# Patient Record
Sex: MALE | Race: WHITE | NOT HISPANIC OR LATINO | Employment: STUDENT | URBAN - METROPOLITAN AREA
[De-identification: names, ages, dates, MRNs, and addresses within clinical notes are randomized per-mention and may not be internally consistent; named-entity substitution may affect disease eponyms.]

---
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
## 2024-04-06 ENCOUNTER — HOSPITAL ENCOUNTER (EMERGENCY)
Facility: HOSPITAL | Age: 24
Discharge: HOME | End: 2024-04-06
Attending: EMERGENCY MEDICINE | Admitting: EMERGENCY MEDICINE
Payer: COMMERCIAL

## 2024-04-06 ENCOUNTER — APPOINTMENT (EMERGENCY)
Dept: CT IMAGING | Facility: HOSPITAL | Age: 24
End: 2024-04-06
Payer: COMMERCIAL

## 2024-04-06 VITALS
SYSTOLIC BLOOD PRESSURE: 117 MMHG | DIASTOLIC BLOOD PRESSURE: 77 MMHG | TEMPERATURE: 98.4 F | OXYGEN SATURATION: 98 % | HEART RATE: 98 BPM | RESPIRATION RATE: 18 BRPM | WEIGHT: 219.8 LBS

## 2024-04-06 DIAGNOSIS — J36 PERITONSILLAR ABSCESS: Primary | ICD-10-CM

## 2024-04-06 LAB
ALBUMIN SERPL BCP-MCNC: 4.3 G/DL (ref 3.5–5)
ALP SERPL-CCNC: 55 U/L (ref 34–104)
ALT SERPL W P-5'-P-CCNC: 9 U/L (ref 7–52)
ANION GAP SERPL CALCULATED.3IONS-SCNC: 16 MMOL/L (ref 4–13)
APTT PPP: 32 SECONDS (ref 23–37)
AST SERPL W P-5'-P-CCNC: 9 U/L (ref 13–39)
ATRIAL RATE: 133 BPM
BASOPHILS # BLD AUTO: 0.11 THOUSANDS/ÂΜL (ref 0–0.1)
BASOPHILS NFR BLD AUTO: 0 % (ref 0–1)
BILIRUB SERPL-MCNC: 0.68 MG/DL (ref 0.2–1)
BUN SERPL-MCNC: 16 MG/DL (ref 5–25)
CALCIUM SERPL-MCNC: 10 MG/DL (ref 8.4–10.2)
CHLORIDE SERPL-SCNC: 97 MMOL/L (ref 96–108)
CO2 SERPL-SCNC: 21 MMOL/L (ref 21–32)
CREAT SERPL-MCNC: 1.07 MG/DL (ref 0.6–1.3)
EOSINOPHIL # BLD AUTO: 0.03 THOUSAND/ÂΜL (ref 0–0.61)
EOSINOPHIL NFR BLD AUTO: 0 % (ref 0–6)
ERYTHROCYTE [DISTWIDTH] IN BLOOD BY AUTOMATED COUNT: 12.9 % (ref 11.6–15.1)
GFR SERPL CREATININE-BSD FRML MDRD: 98 ML/MIN/1.73SQ M
GLUCOSE SERPL-MCNC: 106 MG/DL (ref 65–140)
HCT VFR BLD AUTO: 52.7 % (ref 36.5–49.3)
HGB BLD-MCNC: 18.1 G/DL (ref 12–17)
IMM GRANULOCYTES # BLD AUTO: 0.21 THOUSAND/UL (ref 0–0.2)
IMM GRANULOCYTES NFR BLD AUTO: 1 % (ref 0–2)
INR PPP: 1.05 (ref 0.84–1.19)
LACTATE SERPL-SCNC: 0.9 MMOL/L (ref 0.5–2)
LYMPHOCYTES # BLD AUTO: 1.92 THOUSANDS/ÂΜL (ref 0.6–4.47)
LYMPHOCYTES NFR BLD AUTO: 7 % (ref 14–44)
MCH RBC QN AUTO: 29.6 PG (ref 26.8–34.3)
MCHC RBC AUTO-ENTMCNC: 34.3 G/DL (ref 31.4–37.4)
MCV RBC AUTO: 86 FL (ref 82–98)
MONOCYTES # BLD AUTO: 3.26 THOUSAND/ÂΜL (ref 0.17–1.22)
MONOCYTES NFR BLD AUTO: 11 % (ref 4–12)
NEUTROPHILS # BLD AUTO: 23.01 THOUSANDS/ÂΜL (ref 1.85–7.62)
NEUTS SEG NFR BLD AUTO: 81 % (ref 43–75)
NRBC BLD AUTO-RTO: 0 /100 WBCS
P AXIS: 57 DEGREES
PLATELET # BLD AUTO: 554 THOUSANDS/UL (ref 149–390)
PMV BLD AUTO: 9.4 FL (ref 8.9–12.7)
POTASSIUM SERPL-SCNC: 3.7 MMOL/L (ref 3.5–5.3)
PR INTERVAL: 132 MS
PROCALCITONIN SERPL-MCNC: 0.14 NG/ML
PROT SERPL-MCNC: 8.8 G/DL (ref 6.4–8.4)
PROTHROMBIN TIME: 14.3 SECONDS (ref 11.6–14.5)
QRS AXIS: 59 DEGREES
QRSD INTERVAL: 100 MS
QT INTERVAL: 292 MS
QTC INTERVAL: 434 MS
RBC # BLD AUTO: 6.11 MILLION/UL (ref 3.88–5.62)
S PYO DNA THROAT QL NAA+PROBE: NOT DETECTED
SODIUM SERPL-SCNC: 134 MMOL/L (ref 135–147)
T WAVE AXIS: 59 DEGREES
VENTRICULAR RATE: 133 BPM
WBC # BLD AUTO: 28.54 THOUSAND/UL (ref 4.31–10.16)

## 2024-04-06 PROCEDURE — 83605 ASSAY OF LACTIC ACID: CPT

## 2024-04-06 PROCEDURE — 87070 CULTURE OTHR SPECIMN AEROBIC: CPT

## 2024-04-06 PROCEDURE — 36415 COLL VENOUS BLD VENIPUNCTURE: CPT

## 2024-04-06 PROCEDURE — 96366 THER/PROPH/DIAG IV INF ADDON: CPT

## 2024-04-06 PROCEDURE — 99284 EMERGENCY DEPT VISIT MOD MDM: CPT

## 2024-04-06 PROCEDURE — 80053 COMPREHEN METABOLIC PANEL: CPT

## 2024-04-06 PROCEDURE — 96375 TX/PRO/DX INJ NEW DRUG ADDON: CPT

## 2024-04-06 PROCEDURE — 87651 STREP A DNA AMP PROBE: CPT

## 2024-04-06 PROCEDURE — 85610 PROTHROMBIN TIME: CPT

## 2024-04-06 PROCEDURE — 87040 BLOOD CULTURE FOR BACTERIA: CPT

## 2024-04-06 PROCEDURE — 87147 CULTURE TYPE IMMUNOLOGIC: CPT

## 2024-04-06 PROCEDURE — 93005 ELECTROCARDIOGRAM TRACING: CPT

## 2024-04-06 PROCEDURE — 85025 COMPLETE CBC W/AUTO DIFF WBC: CPT

## 2024-04-06 PROCEDURE — 70491 CT SOFT TISSUE NECK W/DYE: CPT

## 2024-04-06 PROCEDURE — 93010 ELECTROCARDIOGRAM REPORT: CPT | Performed by: INTERNAL MEDICINE

## 2024-04-06 PROCEDURE — 84145 PROCALCITONIN (PCT): CPT

## 2024-04-06 PROCEDURE — 87077 CULTURE AEROBIC IDENTIFY: CPT

## 2024-04-06 PROCEDURE — 85730 THROMBOPLASTIN TIME PARTIAL: CPT

## 2024-04-06 PROCEDURE — 87205 SMEAR GRAM STAIN: CPT

## 2024-04-06 PROCEDURE — 96365 THER/PROPH/DIAG IV INF INIT: CPT

## 2024-04-06 RX ORDER — LIDOCAINE HYDROCHLORIDE AND EPINEPHRINE 10; 10 MG/ML; UG/ML
20 INJECTION, SOLUTION INFILTRATION; PERINEURAL ONCE
Status: COMPLETED | OUTPATIENT
Start: 2024-04-06 | End: 2024-04-06

## 2024-04-06 RX ORDER — DEXAMETHASONE SODIUM PHOSPHATE 10 MG/ML
10 INJECTION, SOLUTION INTRAMUSCULAR; INTRAVENOUS ONCE
Status: COMPLETED | OUTPATIENT
Start: 2024-04-06 | End: 2024-04-06

## 2024-04-06 RX ORDER — METHYLPREDNISOLONE 4 MG/1
TABLET ORAL
Qty: 21 TABLET | Refills: 0 | Status: SHIPPED | OUTPATIENT
Start: 2024-04-06 | End: 2024-04-23

## 2024-04-06 RX ORDER — AMOXICILLIN AND CLAVULANATE POTASSIUM 875; 125 MG/1; MG/1
1 TABLET, FILM COATED ORAL EVERY 12 HOURS
Qty: 20 TABLET | Refills: 0 | Status: SHIPPED | OUTPATIENT
Start: 2024-04-06 | End: 2024-04-16

## 2024-04-06 RX ADMIN — AMPICILLIN AND SULBACTAM 3 G: 10; 5 INJECTION, POWDER, FOR SOLUTION INTRAVENOUS at 14:26

## 2024-04-06 RX ADMIN — SODIUM CHLORIDE 1000 ML: 0.9 INJECTION, SOLUTION INTRAVENOUS at 14:03

## 2024-04-06 RX ADMIN — IOHEXOL 70 ML: 350 INJECTION, SOLUTION INTRAVENOUS at 15:43

## 2024-04-06 RX ADMIN — AMPICILLIN AND SULBACTAM 3 G: 10; 5 INJECTION, POWDER, FOR SOLUTION INTRAVENOUS at 20:07

## 2024-04-06 RX ADMIN — LIDOCAINE HYDROCHLORIDE,EPINEPHRINE BITARTRATE 20 ML: 10; .01 INJECTION, SOLUTION INFILTRATION; PERINEURAL at 18:03

## 2024-04-06 RX ADMIN — DEXAMETHASONE SODIUM PHOSPHATE 10 MG: 10 INJECTION INTRAMUSCULAR; INTRAVENOUS at 14:03

## 2024-04-06 NOTE — ED PROVIDER NOTES
History  Chief Complaint   Patient presents with    Sore Throat - Complicated     Pt reports sore throat/tonsils swelling x4-5 days, pt reports SOB/difficulty breathing, muffled voice     Miguelito is a 23 year old male with no pertinent PMHx presenting to the ED for sore throat, throat swelling, shortness of breath x 1 week. The shortness of breath is worsened when walking, but at rest he is able to comfortably breath. Is having difficulty swallowing due to the swollen tonsils and due to the pain, but is still able to without excessive drooling. He denies fevers, but notes he has been taking ibuprofen a lot - took 1,500 mg prior to arrival to the ED, and also notes he is sweating a lot and waking up in puddles. Denies sick contacts, has never had this before, has not been seen by a provider for this complaint as of yet.         Prior to Admission Medications   Prescriptions Last Dose Informant Patient Reported? Taking?   fluticasone (FLONASE) 50 mcg/act nasal spray   No No   Si spray into each nostril daily      Facility-Administered Medications: None       History reviewed. No pertinent past medical history.    History reviewed. No pertinent surgical history.    History reviewed. No pertinent family history.  I have reviewed and agree with the history as documented.    E-Cigarette/Vaping    E-Cigarette Use Never User      E-Cigarette/Vaping Substances    Nicotine Yes     THC Yes     CBD No     Flavoring No     Other No     Unknown No      Social History     Tobacco Use    Smoking status: Every Day    Smokeless tobacco: Never   Vaping Use    Vaping status: Never Used   Substance Use Topics    Alcohol use: Yes    Drug use: Yes     Types: Marijuana     Comment: last smoked yesterday       Review of Systems   Constitutional:  Positive for diaphoresis. Negative for chills and fever.   HENT:  Positive for sore throat, trouble swallowing and voice change. Negative for facial swelling.    Eyes:  Negative for  "photophobia and visual disturbance.   Respiratory:  Positive for shortness of breath. Negative for cough.    Cardiovascular:  Negative for chest pain and palpitations.   Musculoskeletal:  Negative for myalgias, neck pain and neck stiffness.   Skin:  Negative for rash.   Neurological:  Negative for light-headedness and headaches.       Physical Exam  Physical Exam  Vitals reviewed.   Constitutional:       General: He is not in acute distress.     Appearance: Normal appearance. He is ill-appearing and diaphoretic. He is not toxic-appearing.      Comments: Patient speaking with \"hot potato\" voice.   HENT:      Head: Normocephalic and atraumatic.      Right Ear: Tympanic membrane, ear canal and external ear normal.      Left Ear: Tympanic membrane, ear canal and external ear normal.      Nose: Nose normal.      Mouth/Throat:      Tonsils: Tonsillar exudate and tonsillar abscess (right sided) present.      Comments: Pharynx is patent. Uvular deviation.   Eyes:      General: No scleral icterus.        Right eye: No discharge.         Left eye: No discharge.      Extraocular Movements: Extraocular movements intact.      Conjunctiva/sclera: Conjunctivae normal.   Cardiovascular:      Rate and Rhythm: Normal rate and regular rhythm.      Pulses: Normal pulses.      Heart sounds: Normal heart sounds.   Pulmonary:      Effort: Pulmonary effort is normal. No tachypnea, accessory muscle usage, respiratory distress or retractions.      Breath sounds: Normal breath sounds.   Abdominal:      General: Bowel sounds are normal.      Palpations: Abdomen is soft.      Tenderness: There is no abdominal tenderness. There is no right CVA tenderness, left CVA tenderness, guarding or rebound.   Musculoskeletal:         General: Normal range of motion.      Cervical back: Normal range of motion and neck supple. No rigidity or tenderness.   Lymphadenopathy:      Cervical: Cervical adenopathy present.   Skin:     General: Skin is warm.      " Capillary Refill: Capillary refill takes less than 2 seconds.   Neurological:      General: No focal deficit present.      Mental Status: He is alert.      Sensory: Sensation is intact.      Motor: Motor function is intact. No weakness.      Gait: Gait is intact.   Psychiatric:         Mood and Affect: Mood normal.         Behavior: Behavior normal.       Vital Signs  ED Triage Vitals   Temperature Pulse Respirations Blood Pressure SpO2   04/06/24 1322 04/06/24 1322 04/06/24 1322 04/06/24 1322 04/06/24 1322   98.4 °F (36.9 °C) (!) 146 18 109/80 98 %      Temp Source Heart Rate Source Patient Position - Orthostatic VS BP Location FiO2 (%)   04/06/24 1322 04/06/24 1322 04/06/24 1322 04/06/24 1322 --   Oral Monitor Sitting Right arm       Pain Score       04/06/24 2000       No Pain           Vitals:    04/06/24 1700 04/06/24 1800 04/06/24 1900 04/06/24 2000   BP: 110/71 114/65 116/75 117/77   Pulse: (!) 108 (!) 107 104 98   Patient Position - Orthostatic VS: Sitting Sitting           Visual Acuity      ED Medications  Medications   sodium chloride 0.9 % bolus 1,000 mL (0 mL Intravenous Stopped 4/6/24 1503)   ampicillin-sulbactam (UNASYN) 3 g in sodium chloride 0.9 % 100 mL IVPB (0 g Intravenous Stopped 4/6/24 1456)   dexamethasone (PF) (DECADRON) injection 10 mg (10 mg Intravenous Given 4/6/24 1403)   iohexol (OMNIPAQUE) 350 MG/ML injection (MULTI-DOSE) 70 mL (70 mL Intravenous Given 4/6/24 1543)   lidocaine-epinephrine (XYLOCAINE/EPINEPHRINE) 1 %-1:100,000 injection 20 mL (20 mL Infiltration Given by Other 4/6/24 1803)   ampicillin-sulbactam (UNASYN) 3 g in sodium chloride 0.9 % 100 mL IVPB (3 g Intravenous New Bag 4/6/24 2007)       Diagnostic Studies  Results Reviewed       Procedure Component Value Units Date/Time    Wound culture and Gram stain [[id number]] Collected: 04/06/24 2009    Lab Status: In process Specimen: Wound from Head Updated: 04/06/24 2012    Blood culture #1 [[id number]] Collected: 04/06/24 1408     Lab Status: Preliminary result Specimen: Blood from Arm, Left Updated: 04/06/24 1901     Blood Culture Received in Microbiology Lab. Culture in Progress.    Blood culture #2 [[id number]] Collected: 04/06/24 1411    Lab Status: Preliminary result Specimen: Blood from Arm, Right Updated: 04/06/24 1901     Blood Culture Received in Microbiology Lab. Culture in Progress.    Strep A PCR [[id number]]  (Normal) Collected: 04/06/24 1413    Lab Status: Final result Specimen: Throat Updated: 04/06/24 1454     STREP A PCR Not Detected    Procalcitonin [[id number]]  (Normal) Collected: 04/06/24 1349    Lab Status: Final result Specimen: Blood from Arm, Left Updated: 04/06/24 1453     Procalcitonin 0.14 ng/ml     Comprehensive metabolic panel [[id number]]  (Abnormal) Collected: 04/06/24 1403    Lab Status: Final result Specimen: Blood from Arm, Left Updated: 04/06/24 1443     Sodium 134 mmol/L      Potassium 3.7 mmol/L      Chloride 97 mmol/L      CO2 21 mmol/L      ANION GAP 16 mmol/L      BUN 16 mg/dL      Creatinine 1.07 mg/dL      Glucose 106 mg/dL      Calcium 10.0 mg/dL      AST 9 U/L      ALT 9 U/L      Alkaline Phosphatase 55 U/L      Total Protein 8.8 g/dL      Albumin 4.3 g/dL      Total Bilirubin 0.68 mg/dL      eGFR 98 ml/min/1.73sq m     Narrative:      National Kid[employer] Foundation guidelines for Chronic Kidney Disease (CKD):     Stage 1 with normal or high GFR (GFR > 90 mL/min/1.73 square meters)    Stage 2 Mild CKD (GFR = 60-89 mL/min/1.73 square meters)    Stage 3A Moderate CKD (GFR = 45-59 mL/min/1.73 square meters)    Stage 3B Moderate CKD (GFR = 30-44 mL/min/1.73 square meters)    Stage 4 Severe CKD (GFR = 15-29 mL/min/1.73 square meters)    Stage 5 End Stage CKD (GFR <15 mL/min/1.73 square meters)  Note: GFR calculation is accurate only with a steady state creatinine    Lactic acid, plasma (w/reflex if result > 2.0) [[id number]]  (Normal) Collected: 04/06/24 1403    Lab Status: Final result Specimen: Blood  from Arm, Left Updated: 04/06/24 1442     LACTIC ACID 0.9 mmol/L     Narrative:      Result may be elevated if tourniquet was used during collection.    Protime-INR [[id number]]  (Normal) Collected: 04/06/24 1349    Lab Status: Final result Specimen: Blood from Arm, Left Updated: 04/06/24 1438     Protime 14.3 seconds      INR 1.05    APTT [[id number]]  (Normal) Collected: 04/06/24 1349    Lab Status: Final result Specimen: Blood from Arm, Left Updated: 04/06/24 1438     PTT 32 seconds     CBC and differential [[id number]]  (Abnormal) Collected: 04/06/24 1349    Lab Status: Final result Specimen: Blood from Arm, Left Updated: 04/06/24 1425     WBC 28.54 Thousand/uL      RBC 6.11 Million/uL      Hemoglobin 18.1 g/dL      Hematocrit 52.7 %      MCV 86 fL      MCH 29.6 pg      MCHC 34.3 g/dL      RDW 12.9 %      MPV 9.4 fL      Platelets 554 Thousands/uL      nRBC 0 /100 WBCs      Neutrophils Relative 81 %      Immature Grans % 1 %      Lymphocytes Relative 7 %      Monocytes Relative 11 %      Eosinophils Relative 0 %      Basophils Relative 0 %      Neutrophils Absolute 23.01 Thousands/µL      Absolute Immature Grans 0.21 Thousand/uL      Absolute Lymphocytes 1.92 Thousands/µL      Absolute Monocytes 3.26 Thousand/µL      Eosinophils Absolute 0.03 Thousand/µL      Basophils Absolute 0.11 Thousands/µL                    CT soft tissue neck   Final Result by Ranjith Augustin MD (04/06 1723)      1.  Acute palatine tonsillitis with right peritonsillar inflammation and 3 cm right peritonsillar abscess.   2.  Reactive prominent cervical lymph nodes (right greater than left).            The study was marked in EPIC for immediate notification.               Workstation performed: [id number]                    Procedures  ECG 12 Lead Documentation Only    Date/Time: 4/6/2024 1:40 PM    Performed by: Fatimah Rodriguez PA-C  Authorized by: Fatimah Rodriguez PA-C    Indications / Diagnosis:  Tachycardia.  ECG reviewed by  me, the ED Provider: yes    Patient location:  ED  Previous ECG:     Previous ECG:  Unavailable    Comparison to cardiac monitor: Yes    Rate:     ECG rate:  133    ECG rate assessment: tachycardic    Rhythm:     Rhythm: sinus tachycardia    Ectopy:     Ectopy: none    QRS:     QRS axis:  Normal    QRS intervals:  Normal  Conduction:     Conduction: normal    ST segments:     ST segments:  Normal  T waves:     T waves: normal             ED Course  ED Course as of 04/06/24 2056   Sat Apr 06, 2024   1434 ECG 12 lead  Per my interpretation, sinus tachycardia at 133 beats per minute.   1445 WBC(!): 28.54   1450 Sodium(!): 134  Pt given normal saline.   1455 LACTIC ACID: 0.9   1456 STREP A PCR: Not Detected   1537 Called CT for update, they report he is next.   1725 CT soft tissue neck  IMPRESSION:     1.  Acute palatine tonsillitis with right peritonsillar inflammation and 3 cm right peritonsillar abscess.  2.  Reactive prominent cervical lymph nodes (right greater than left).     1729 TT sent to ENT on call.   1735 ENT requesting lidocaine with epinephrine. Will see the patient.   1954 ENT requesting another dose of ABX. I discussed admitting the patient with the patient, he would like to think about it at this time.   2019 Pt has decided to go home after IV ABX. Lengthy discussion discussed to include potential complications from going home and return precautions.                            Initial Sepsis Screening       Row Name 04/06/24 1430                Is the patient's history suggestive of a new or worsening infection? Yes (Proceed)  -AB        Suspected source of infection soft tissue  -AB        Indicate SIRS criteria Tachycardia > 90 bpm;Leukocytosis (WBC > 93211 IJL) OR Leukopenia (WBC <4000 IJL) OR Bandemia (WBC >10% bands)  -AB        Are two or more of the above signs & symptoms of infection both present and new to the patient? Yes (Proceed)  -AB        Assess for evidence of organ dysfunction: Are any  of the below criteria present within 6 hours of suspected infection and SIRS criteria that are NOT considered to be chronic conditions? --                  User Key  (r) = Recorded By, (t) = Taken By, (c) = Cosigned By      Initials Name Provider Type    AB Fatimah Rodriguez PA-C Physician Assistant                                  Medical Decision Making  23 year old male presenting to the ED for sore throat, worse on the right side, hot potato voice, shortness of breath, and diaphoretic episodes. Physical exam with obvious right sided peritonsillar abscess, the airway remained patent and patient saturated from % on room air. No gasping or signs for respiratory distress. Pt with no fever but did take 1,500 mg ibuprofen prior to arrival and with his diaphoresis I suspect fever. Does meet SIRS criteria with tachycardia and leukocytosis at 28, but negative pro calcitonin and lactic. Pt was initiated on Unasyn prior to lab results due to known infection. CT neck confirmed right sided abscess of 3 cm. I contacted ENT regarding this, they stated they would be down to see him. Pt maintaining airway without assistance throughout entire encounter.    After ENT evaluation and bedside drainage, he cleared patient from ENT standpoint with recommendations of another dose of IV ABX, with a 10 day course of Augmentin and a medrol dose pack. This was ordered. Upon reevaluation patient appears much more comfortable at this time and his phonation returned to normal. I am recommending the patient be admitted due to marked leukocytosis and persistent tachycardia, patient is unsure whether or not he has to stay. I had a lengthy discussion regarding risks of leaving vs benefits of staying in the hospital overnight. These reasons include but not limited to continued IV ABX therapy and fluids, potential for worsening condition, potential for sepsis or worsening infection, potential for severe deterioration in condition.  "Patient has full medical decision making capacity and verbalizes risks of going home rather than admission, would like to continue with the plan to be discharged home. I had a conversation regarding strict return precautions to include but not limited to, fevers, lethargy, vision disturbances, worsening neck swelling, voice becoming hoarse again, difficulty swallowing and/or causing drooling, difficulty breathing, passing out, difficulty opening mouth. Pt verbalizes understanding.     Pt stable at time of discharge, vital signs reviewed, questions answered. Strict ER return precautions provided/discussed and were well understood by patient. Patient's vitals, labs and/or imaging results, diagnosis, and treatment plan were discussed with the patient. All new and/or changed medications were discussed - specifically to include route of administration, how often to take, when to take, and the pharmacy they were sent to. Strict return precautions as well as close follow up with PCP was discussed with the patient and the patient was agreeable to my recommendations.  Patient verbally acknowledged understanding. All labs, imaging were reviewed and used in the medical decision making process (if ordered).     Portions of this chart may have been written with voice recognition software.  Occasional grammatical errors, wrong word or \"sound a like\" substitutions may have occurred due to software limitations.  Please read carefully and use context to recognize where substitutions have occurred.    Problems Addressed:  Peritonsillar abscess: acute illness or injury    Amount and/or Complexity of Data Reviewed  Labs: ordered. Decision-making details documented in ED Course.  Radiology: ordered. Decision-making details documented in ED Course.  ECG/medicine tests: independent interpretation performed. Decision-making details documented in ED Course.     Details: Per my interpretation, sinus tachycardia.  Discussion of management or " test interpretation with external provider(s): Discussed case with Dr. Jason Serrano from ENT. Requested I order lidocaine with epinephrine and he would be down to see the patient. Once drained, ENT physician notified me to administer one more IV dose of ABX then discharge from their standpoint. ABX and steroids recommended.    Risk  Prescription drug management.  Decision regarding hospitalization.  Risk Details: ENT aspiration of right sided peritonsillar abscess.             Disposition  Final diagnoses:   Peritonsillar abscess     Time reflects when diagnosis was documented in both MDM as applicable and the Disposition within this note       Time User Action Codes Description Comment    4/6/2024  5:33 PM Fatimah Rodriguez Add [J36] Peritonsillar abscess           ED Disposition       ED Disposition   Discharge    Condition   Stable    Date/Time   Sat Apr 6, 2024  8:40 PM    Comment   Miguelito Bolivar discharge to home/self care.                   Follow-up Information       Follow up With Specialties Details Why Contact Info Additional Information    [facility] Schedule an appointment as soon as possible for a visit  Follow up [address] [place] [id number]     Finn Lang MD Family Medicine  Follow up, As needed [address] [id number] [phone]       [facility] Emergency Department Emergency Medicine Go to  If symptoms worsen [address] [place] [id number] [phone] [facility] Emergency Department, [address] [place] [address], [phone]            Patient's Medications   Discharge Prescriptions    AMOXICILLIN-CLAVULANATE (AUGMENTIN) 875-125 MG PER TABLET    Take 1 tablet by mouth every 12 (twelve) hours for 10 days       Start Date: 4/6/2024  End Date: 4/16/2024       Order Dose: 1 tablet       Quantity: 20 tablet    Refills: 0     METHYLPREDNISOLONE 4 MG TABLET THERAPY PACK    Use as directed on package       Start Date: 4/6/2024  End Date: --       Order Dose: --       Quantity: 21 tablet    Refills: 0           PDMP Review       None            ED Provider  Electronically Signed by             Fatimah Rodriguez PA-C  04/06/24 2056

## 2024-04-06 NOTE — CONSULTS
Consultation - [facility]/ENT   Miguelito Bolivar 23 y.o. male MRN: [id number]  Unit/Bed#: ED-29 Encounter: [id number]        Assessment:  23-year-old man presents with right peritonsillar abscess now status post bedside drainage.    Plan:  Patient adequate for discharge from ENT perspective.  Recommend 10-day course of Augmentin.  Recommend Medrol Dosepak.  Follow up culture results.  Follow-up with ENT as an outpatient in 2 weeks.    History of Present Illness   Physician Requesting Consult: Mitzy Desouza MD  Reason for Consult / Principal Problem: Sore throat  HPI: Miguelito Bolivar is a 23 y.o. year old male who presents with sore throat that started this past Tuesday.  Worsening.  Associated with reduced mouth opening.  Also had dysphagia.  He states he has never had a peritonsillar abscess before.  Sometimes gets a sore throat depending upon the year.  No known medical conditions.    Review of systems:  10 Point ROS was performed and negative except as above or otherwise noted in the medical record.    Historical Information   History reviewed. No pertinent past medical history.  History reviewed. No pertinent surgical history.  Social History   Social History     Substance and Sexual Activity   Alcohol Use Yes     Social History     Substance and Sexual Activity   Drug Use Yes    Types: Marijuana    Comment: last smoked yesterday     Social History     Tobacco Use   Smoking Status Every Day   Smokeless Tobacco Never     Family History: non-contributory    Meds/Allergies   all current active meds have been reviewed    No Known Allergies    Objective     Vitals:    04/06/24 1900   BP: 116/75   Pulse: 104   Resp: 18   Temp:    SpO2: 98%         Physical Exam   Constitutional: Oriented to person, place, and time. Well-developed and well-nourished, no apparent distress, non-toxic appearance. Cooperative, able to hear and answer questions without difficulty.    Voice: Slightly muffled  Head: Normocephalic, atraumatic.  No  scars, masses or lesions.  Face: Symmetric, no edema, no sinus tenderness.  Eyes: Vision grossly intact, extra-ocular movement intact.  Ears: External ears normal.  No post-auricular erythema or tenderness.  Nose: Nares clear.  Oral cavity: Dentition intact.  Mucosa moist, lips without lesions or masses.  Tongue mobile, floor of mouth soft and flat.  Hard palate intact.  No masses or lesions. Reduced mouth opening.  Oropharynx: Uvula is midline, soft palate intact without lesion or mass.  Oropharyngeal inlet without obstruction.  Tonsils 3+.  Posterior pharyngeal wall clear. No masses or lesions.  Neck: Normal laryngeal elevation with swallow.  Trachea midline.  No masses or lesions. No palpable adenopathy.  Musculoskeletal: Normal range of motion.   Neurological: Cranial nerves 2-12 intact.  Skin: Skin is warm and dry.   Psychiatric: Normal mood and affect.    Procedure: Incision and drainage of peritonsillar abscess    Indications: right sided peritonsillar abscess    Procedure in detail: After informed verbal consent was obtained from the patient, the oropharynx was anesthetized with a combination of 2 ml of 2% viscous lidocaine and 3 ml of 1% lidocaine with 1:100,000 epinephrine. After adequate time for anesthesia, the right sided peritonsillar abscess was aspirated and sent for culture. The peritonsillar space was opened using a 15 blade and all loculations were opened using  Britni clamp. The patient tolerated the procedure well and was returned to the care of nursing in good condition.       Intake/Output Summary (Last 24 hours) at 4/6/2024 1941  Last data filed at 4/6/2024 1456  Gross per 24 hour   Intake 100 ml   Output --   Net 100 ml       Invasive Devices       Peripheral Intravenous Line  Duration             Peripheral IV 04/06/24 Left Antecubital <1 day    Peripheral IV 04/06/24 Left;Ventral (anterior) Forearm <1 day                    Lab Results: I have personally reviewed pertinent lab results.     Imaging Studies: I have personally reviewed pertinent reports.    EKG, Pathology, and Other Studies: I have personally reviewed pertinent reports.      Code Status: No Order  Advance Directive and Living Will:      Power of :    POLST:

## 2024-04-06 NOTE — SEPSIS NOTE
Sepsis Note   Miguelito Bolivar 23 y.o. male MRN: [id number]  Unit/Bed#: ED-29 Encounter: [id number]       Initial Sepsis Screening       Row Name 04/06/24 1430                Is the patient's history suggestive of a new or worsening infection? Yes (Proceed)  -AB        Suspected source of infection soft tissue  -AB        Indicate SIRS criteria Tachycardia > 90 bpm;Leukocytosis (WBC > 01538 IJL) OR Leukopenia (WBC <4000 IJL) OR Bandemia (WBC >10% bands)  -AB        Are two or more of the above signs & symptoms of infection both present and new to the patient? Yes (Proceed)  -AB        Assess for evidence of organ dysfunction: Are any of the below criteria present within 6 hours of suspected infection and SIRS criteria that are NOT considered to be chronic conditions? --                  User Key  (r) = Recorded By, (t) = Taken By, (c) = Cosigned By      Initials Name Provider Type    AB Fatimah Rodriguez PA-C Physician Assistant                        There is no height or weight on file to calculate BMI.  Wt Readings from Last 1 Encounters:   04/06/24 99.7 kg (219 lb 12.8 oz)        Ideal body weight: 73 kg (160 lb 15 oz)  Adjusted ideal body weight: 83.7 kg (184 lb 7.7 oz)

## 2024-04-07 NOTE — DISCHARGE INSTRUCTIONS
1 - Please begin taking Augmentin antibiotic tomorrow two times per day for ten days. Please be sure to finish this in full even if symptoms resolve or improve.  2 - Begin taking the steroid pack as prescribed.   3 - Follow up with ENT in two weeks. I have provided a referral and provided their main phone number below.

## 2024-04-09 LAB
BACTERIA WND AEROBE CULT: ABNORMAL
GRAM STN SPEC: ABNORMAL
GRAM STN SPEC: ABNORMAL

## 2024-04-11 LAB
BACTERIA BLD CULT: NORMAL
BACTERIA BLD CULT: NORMAL